# Patient Record
(demographics unavailable — no encounter records)

---

## 2024-10-09 NOTE — PHYSICAL EXAM
[Left lower extremity below knee] : left lower extremity below knee [Left lower extremity above knee] : left lower extremity above knee [] : patient ambulates without assistive device

## 2024-10-09 NOTE — HISTORY OF PRESENT ILLNESS
[8] : 8 [Throbbing] : throbbing [Household chores] : household chores [Sleep] : sleep [Bending forward] : bending forward [de-identified] : 7/17/23: Here for fu on the low back pain; the pain continues; he is scheduled for surgery on 8/8/23. Symptoms have worsening significantly - has been in the hospital er and here to thE  WITH THIS new worsening pain - no new trauma - pain very severe at this point - has received multiple medication and injectoins now for the past few weeks -cant sleep  8/4/23: Here for fu pre-op  has question  Has a new MRi which we reviewed   08/23/23 follow up s/p lower spine surgery - overall leg is feeling better - back is very sore - wound is dry - medication helps   xrays today: L spine - no instablity   9/20/23: here now about 6 weeks - back pain remains and having sharp spasm - legs feeling better  has been taking it easy  not taking much medicatoin wound has healed  11/3/23: Here for follow up. 3 months post op. States leg pain is better, back is sore. Not taking any medications. No issues with wound  1/5/24: HEre for follow up pain remains  pain with sitting/standing for long   3/8/24: Here for fu - plan at last was "reviewed the post op course  PT rx provided Can return to work  1/2 home, 1/2 office Follow up in two months" - overall symptoms remain the same - he has not been to PT since last visit  9/11/24 Patient here for  back pain; pain worse to the left side of the mid back and lower back. Patient states he still has pain, and it got worse since last visit. Sitting causes more pain. Tylenol, motrin and flexeril without relief.  Had injections in the past without relief.   xrays today L-spine - no instability, scoliosis  T spine - negative  no change since the last xrays  --------------------------------  10/7/24: Patient here for fu on the mid back and lower back. Plan at last was "reviewed the case and the imaging with him worsening pain  PT rx provided Can return to work  FMLA paperwork we will take care for him MRI T and L spine  fu to review the MRI"   Here to review MRI results Pain in the middle and lower back   MRi T and l spine - see report - [facility]  [] : no [FreeTextEntry1] : Lower back  [FreeTextEntry5] : pt is here for a follow up states he has localized pain on sx site DOS 8/8/2023. would like a script for PT [de-identified] : movement

## 2024-10-09 NOTE — DISCUSSION/SUMMARY
[Surgical risks reviewed] : Surgical risks reviewed [de-identified] : reviewed the case and the imaging with the patient  persists with severe pain  discogenic back pain  discussion of the condition and treatment options cautions discussed questions answered discussion of natural history of the condition and what the next step would be referral to Dr kuhn  consider intracept? no surgical target noted

## 2024-10-16 NOTE — HISTORY OF PRESENT ILLNESS
[Lower back] : lower back [10] : 10 [Household chores] : household chores [Leisure] : leisure [Sleep] : sleep [Social interactions] : social interactions [Nothing helps with pain getting better] : Nothing helps with pain getting better [Sitting] : sitting [Standing] : standing [Walking] : walking [Bending forward] : bending forward [FreeTextEntry1] : pt states he is having pain he had a surgery back in 2023 , past few months he has been in a lot of pain  [] : no [de-identified] : 08/08/2023

## 2024-10-16 NOTE — PHYSICAL EXAM
[de-identified] : PHYSICAL EXAM  Constitutional:  Appears well, no apparent distress Ability to communicate: Normal Respiratory: non-labored breathing Skin: no rash noted Head: normocephalic, atraumatic Neck: no visible thyroid enlargement Eyes: extraocular movements intact Neurologic: alert and oriented x3 Psychiatric: normal mood, affect, and behavior  Lumbar Spine:  Palpation: left lumbar paraspinal spasm and left lumbar paraspinal tenderness to palpation. ROM: Diminished range of motion in all plains.  Patient notes pain with lateral bending to the left. MMT: Motor exam is 5/5 through out bilateral lower extremities. Sensation: Light touch and pain is intact throughout bilateral lower extremities. Reflexes: achilles and patella reflexes are intact and  symmetrical.  No sustained clonus. Special Testing: Positive kemps maneuver on the left side  Assessemnt: Lumbar spondylosis (m47.816) Myalgia (M79.10)  Plan: After discussing various treatment options with the patient including but not limited to oral medications, physical therapy, exercise modalities as well as interventional spinal injections, we have decided with the following plan: The patient is presenting with axial lumbar pain that has not responded to three months of conservative therapy including physical therapy or nsaid therapy. The pain is interfering with activities of daily living and functionality.  There is no radicular pain.  The pain is exacerbated by facet loading.  Positive kemps maneuver which is defined by pain reproduction with extension and rotation of the lumbar spine to the affected side.  The patient has not had a vertebral fusion at the levels of the proposed treatment.  There is no unexplained neurologic deficit.  There is no bleeding tendency, unstable medical condition, or systemic infection.  The injection is being performed to diagnose the facet joint as the source of the individuals pain.  The risks, benefits and alternatives of the proposed procedure were explained in detail with the patient.  The risks outlined include but are not limited to infection, bleeding, post dural puncture headache, nerve injury, a temporary increase in pain, failure to resolve symptoms, allergic reaction, symptom recurrence, and possible elevation of blood sugar.  All questions were answered to patient's satisfaction and he/she verbalized an understanding.  Follow up 1-2 weeks post injection foe re-evaluation.  Continue home exercises, stretching, activity modification, physical therapy, and conservative care.

## 2024-10-18 NOTE — PROCEDURE
[FreeTextEntry3] : Date of Service: 10/18/2024   Account: [id number]   Patient: JOSEPH REYES   YOB: 1982   Age: 42 year     Surgeon:                                                         Orion Douglass M.D.   Assistant:                                                        None.   Pre-Operative Diagnosis: Spondylosis of lumbar region without myelopathy or radiculopathy                                 Post Operative Diagnosis: Spondylosis of lumbar region without myelopathy or radiculopathy                                Procedure:                                                      Left L2, L3, L4, L5 medial branch  block with image    This procedure was carried out using fluoroscopic guidance.  The risks and benefits of the procedure were discussed extensively with the patient.  The consent of the patient was obtained and the following procedure was performed.   The patient was placed in the prone position.  The patient's back was prepped and draped in a sterile fashion.  The lumbar vertebral bodies were identified and the fluoroscope left obliqued to approximately 30 degrees to reveal good "Jose-dog" anatomical view.  The junction of the superior articulate process and tranverse process at the  L3, L4, and L5 levels were identified and marked. The skin at these target points was then localized using 1 cc of 1% Lidocaine without epinephrine at each injection site.  A spinal needle was then introduced and advanced at these three levels to the above target points at the junction of the SAP and transverse processes until oss was contacted.  After this, the left sacral ala was identified on AP view and an additional spinal needle was advanced to this point until os was contacted.  After negative aspiration for heme and CSF, an injectate of 1cc 0.25% marcaine was injected at each of the four injection sites.   The needles were then removed and pressure was applied.  Anesthesia personnel were present throughout the procedure.   The patient was instructed to apply ice over the injection site for twenty minutes every two hours for the next 24 to 48 hours.  The patient was also instructed to contact me immediately if there were any problems.     Orion Douglass M.D

## 2024-11-04 NOTE — PHYSICAL EXAM
[de-identified] : PHYSICAL EXAM  Constitutional:  Appears well, no apparent distress Ability to communicate: Normal Respiratory: non-labored breathing Skin: no rash noted Head: normocephalic, atraumatic Neck: no visible thyroid enlargement Eyes: extraocular movements intact Neurologic: alert and oriented x3 Psychiatric: normal mood, affect, and behavior  Lumbar Spine:  Palpation: left lumbar paraspinal spasm and left lumbar paraspinal tenderness to palpation. ROM: Diminished range of motion in all plains.  Patient notes pain with lateral bending to the left. MMT: Motor exam is 5/5 through out bilateral lower extremities. Sensation: Light touch and pain is intact throughout bilateral lower extremities. Reflexes: achilles and patella reflexes are intact and  symmetrical.  No sustained clonus. Special Testing: Positive kemps maneuver on the left side  Assessemnt: Lumbar spondylosis (m47.816) Myalgia (M79.10)  Plan: After discussing various treatment options with the patient including but not limited to oral medications, physical therapy, exercise modalities as well as interventional spinal injections, we have decided with the following plan: The patient is presenting with axial lumbar pain that has not responded to three months of conservative therapy including physical therapy or nsaid therapy. The pain is interfering with activities of daily living and functionality.  There is no radicular pain.  The pain is exacerbated by facet loading.  Positive kemps maneuver which is defined by pain reproduction with extension and rotation of the lumbar spine to the affected side.  The patient has not had a vertebral fusion at the levels of the proposed treatment.  There is no unexplained neurologic deficit.  There is no bleeding tendency, unstable medical condition, or systemic infection.  The injection is being performed to diagnose the facet joint as the source of the individuals pain.  The risks, benefits and alternatives of the proposed procedure were explained in detail with the patient.  The risks outlined include but are not limited to infection, bleeding, post dural puncture headache, nerve injury, a temporary increase in pain, failure to resolve symptoms, allergic reaction, symptom recurrence, and possible elevation of blood sugar.  All questions were answered to patient's satisfaction and he/she verbalized an understanding.  Follow up 1-2 weeks post injection foe re-evaluation.  Continue home exercises, stretching, activity modification, physical therapy, and conservative care.

## 2024-11-04 NOTE — HISTORY OF PRESENT ILLNESS
[Lower back] : lower back [Household chores] : household chores [Leisure] : leisure [Sleep] : sleep [Social interactions] : social interactions [Nothing helps with pain getting better] : Nothing helps with pain getting better [Sitting] : sitting [Standing] : standing [Walking] : walking [Bending forward] : bending forward [6] : 6 [FreeTextEntry1] : LT L2-L5 MBB- 10/18/2024- pt is following up  states that he is still having pain  [] : no [de-identified] : 08/08/2023

## 2024-11-15 NOTE — PROCEDURE
[FreeTextEntry3] : Date of Service: 11/15/2024   Account: [id number]   Patient: JOSEPH REYES   YOB: 1982   Age: 42 year     Surgeon:                                                         Orion Douglass M.D.   Assistant:                                                        None.   Pre-Operative Diagnosis: Spondylosis of lumbar region without myelopathy or radiculopathy                                 Post Operative Diagnosis: Spondylosis of lumbar region without myelopathy or radiculopathy                                Procedure:                                                      Left L2, L3, L4, L5 medial branch  block with image      This procedure was carried out using fluoroscopic guidance.  The risks and benefits of the procedure were discussed extensively with the patient.  The consent of the patient was obtained and the following procedure was performed.   The patient was placed in the prone position.  The patient's back was prepped and draped in a sterile fashion.  The lumbar vertebral bodies were identified and the fluoroscope left obliqued to approximately 30 degrees to reveal good "Jose-dog" anatomical view.  The junction of the superior articulate process and tranverse process at the  L3, L4, and L5 levels were identified and marked. The skin at these target points was then localized using 1 cc of 1% Lidocaine without epinephrine at each injection site.  A spinal needle was then introduced and advanced at these three levels to the above target points at the junction of the SAP and transverse processes until oss was contacted.  After this, the left sacral ala was identified on AP view and an additional spinal needle was advanced to this point until os was contacted.  After negative aspiration for heme and CSF, an injectate of 1cc 0.25% marcaine was injected at each of the four injection sites.   The needles were then removed and pressure was applied.  Anesthesia personnel were present throughout the procedure.   The patient was instructed to apply ice over the injection site for twenty minutes every two hours for the next 24 to 48 hours.  The patient was also instructed to contact me immediately if there were any problems.     Orion Douglass M.D

## 2024-12-03 NOTE — PROCEDURE
[Trigger point 1-2 muscle groups] : trigger point 1-2 muscle groups [Left] : of the left [Rhomboid muscle] : rhomboid muscle [Pain] : pain [Inflammation] : inflammation [Alcohol] : alcohol [Ethyl Chloride sprayed topically] : ethyl chloride sprayed topically [Sterile technique used] : sterile technique used [___ cc    1%] : Lidocaine ~Vcc of 1%  [___ cc    0.25%] : Bupivacaine (Marcaine) ~Vcc of 0.25%  [___ cc    10mg] : Triamcinolone (Kenalog) ~Vcc of 10 mg  [] : Patient tolerated procedure well [Call if redness, pain or fever occur] : call if redness, pain or fever occur [Risks, benefits, alternatives discussed / Verbal consent obtained] : the risks benefits, and alternatives have been discussed, and verbal consent was obtained

## 2024-12-03 NOTE — PHYSICAL EXAM
[] : rhomboid tenderness [de-identified] : PHYSICAL EXAM  Constitutional:  Appears well, no apparent distress Ability to communicate: Normal Respiratory: non-labored breathing Skin: no rash noted Head: normocephalic, atraumatic Neck: no visible thyroid enlargement Eyes: extraocular movements intact Neurologic: alert and oriented x3 Psychiatric: normal mood, affect, and behavior  Lumbar Spine:  Palpation: left lumbar paraspinal spasm and left lumbar paraspinal tenderness to palpation. ROM: Diminished range of motion in all plains.  Patient notes pain with lateral bending to the left. MMT: Motor exam is 5/5 through out bilateral lower extremities. Sensation: Light touch and pain is intact throughout bilateral lower extremities. Reflexes: achilles and patella reflexes are intact and  symmetrical.  No sustained clonus. Special Testing: Positive kemps maneuver on the left side  Assessemnt: Lumbar spondylosis (m47.816) Myalgia (M79.10)  Plan: After discussing various treatment options with the patient including but not limited to oral medications, physical therapy, exercise modalities as well as interventional spinal injections, we have decided with the following plan: The patient is presenting with axial lumbar pain that has not responded to three months of conservative therapy including physical therapy or nsaid therapy. The pain is interfering with activities of daily living and functionality.  There is no radicular pain.  The pain is exacerbated by facet loading.  Positive kemps maneuver which is defined by pain reproduction with extension and rotation of the lumbar spine to the affected side.  The patient has not had a vertebral fusion at the levels of the proposed treatment.  There is no unexplained neurologic deficit.  There is no bleeding tendency, unstable medical condition, or systemic infection.  The injection is being performed to diagnose the facet joint as the source of the individuals pain.  The risks, benefits and alternatives of the proposed procedure were explained in detail with the patient.  The risks outlined include but are not limited to infection, bleeding, post dural puncture headache, nerve injury, a temporary increase in pain, failure to resolve symptoms, allergic reaction, symptom recurrence, and possible elevation of blood sugar.  All questions were answered to patient's satisfaction and he/she verbalized an understanding.  Follow up 1-2 weeks post injection foe re-evaluation.  Continue home exercises, stretching, activity modification, physical therapy, and conservative care.

## 2024-12-03 NOTE — PHYSICAL EXAM
[] : rhomboid tenderness [de-identified] : PHYSICAL EXAM  Constitutional:  Appears well, no apparent distress Ability to communicate: Normal Respiratory: non-labored breathing Skin: no rash noted Head: normocephalic, atraumatic Neck: no visible thyroid enlargement Eyes: extraocular movements intact Neurologic: alert and oriented x3 Psychiatric: normal mood, affect, and behavior  Lumbar Spine:  Palpation: left lumbar paraspinal spasm and left lumbar paraspinal tenderness to palpation. ROM: Diminished range of motion in all plains.  Patient notes pain with lateral bending to the left. MMT: Motor exam is 5/5 through out bilateral lower extremities. Sensation: Light touch and pain is intact throughout bilateral lower extremities. Reflexes: achilles and patella reflexes are intact and  symmetrical.  No sustained clonus. Special Testing: Positive kemps maneuver on the left side  Assessemnt: Lumbar spondylosis (m47.816) Myalgia (M79.10)  Plan: After discussing various treatment options with the patient including but not limited to oral medications, physical therapy, exercise modalities as well as interventional spinal injections, we have decided with the following plan: The patient is presenting with axial lumbar pain that has not responded to three months of conservative therapy including physical therapy or nsaid therapy. The pain is interfering with activities of daily living and functionality.  There is no radicular pain.  The pain is exacerbated by facet loading.  Positive kemps maneuver which is defined by pain reproduction with extension and rotation of the lumbar spine to the affected side.  The patient has not had a vertebral fusion at the levels of the proposed treatment.  There is no unexplained neurologic deficit.  There is no bleeding tendency, unstable medical condition, or systemic infection.  The injection is being performed to diagnose the facet joint as the source of the individuals pain.  The risks, benefits and alternatives of the proposed procedure were explained in detail with the patient.  The risks outlined include but are not limited to infection, bleeding, post dural puncture headache, nerve injury, a temporary increase in pain, failure to resolve symptoms, allergic reaction, symptom recurrence, and possible elevation of blood sugar.  All questions were answered to patient's satisfaction and he/she verbalized an understanding.  Follow up 1-2 weeks post injection foe re-evaluation.  Continue home exercises, stretching, activity modification, physical therapy, and conservative care.

## 2024-12-03 NOTE — HISTORY OF PRESENT ILLNESS
[Lower back] : lower back [6] : 6 [Household chores] : household chores [Leisure] : leisure [Sleep] : sleep [Social interactions] : social interactions [Nothing helps with pain getting better] : Nothing helps with pain getting better [Sitting] : sitting [Standing] : standing [Walking] : walking [Bending forward] : bending forward [10] : 10 [Radiating] : radiating [FreeTextEntry1] : LT L2-L5 MBB-11/15/2024  LT L2-L5 MBB- 10/18/2024- pt is following up  states that he is still having pain  [] : no [de-identified] : 08/08/2023

## 2024-12-03 NOTE — ASSESSMENT
[FreeTextEntry1] : no relief with L LS MBB #2  pain in lower back  lifted table and developed pain beneath L shoulder blade